# Patient Record
Sex: FEMALE | Race: WHITE | Employment: FULL TIME
[De-identification: names, ages, dates, MRNs, and addresses within clinical notes are randomized per-mention and may not be internally consistent; named-entity substitution may affect disease eponyms.]

---

## 2023-04-26 ENCOUNTER — HOSPITAL ENCOUNTER (EMERGENCY)
Facility: HOSPITAL | Age: 33
Discharge: HOME | End: 2023-04-26
Attending: EMERGENCY MEDICINE

## 2023-04-26 VITALS
SYSTOLIC BLOOD PRESSURE: 120 MMHG | TEMPERATURE: 97.4 F | DIASTOLIC BLOOD PRESSURE: 79 MMHG | HEART RATE: 74 BPM | OXYGEN SATURATION: 98 % | BODY MASS INDEX: 31.89 KG/M2 | HEIGHT: 63 IN | WEIGHT: 180 LBS | RESPIRATION RATE: 18 BRPM

## 2023-04-26 DIAGNOSIS — R42 DIZZINESS: Primary | ICD-10-CM

## 2023-04-26 DIAGNOSIS — Z33.1 PREGNANCY, INCIDENTAL: ICD-10-CM

## 2023-04-26 LAB
ALBUMIN SERPL-MCNC: 3.1 G/DL (ref 3.4–5)
ALBUMIN/GLOB SERPL: 0.8 (ref 0.8–1.7)
ALP SERPL-CCNC: 56 U/L (ref 45–117)
ALT SERPL-CCNC: 21 U/L (ref 13–56)
ANION GAP SERPL CALC-SCNC: 4 MMOL/L (ref 3–18)
APPEARANCE UR: CLEAR
AST SERPL-CCNC: 12 U/L (ref 10–38)
BASOPHILS # BLD: 0 K/UL (ref 0–0.1)
BASOPHILS NFR BLD: 0 % (ref 0–2)
BILIRUB SERPL-MCNC: 0.2 MG/DL (ref 0.2–1)
BILIRUB UR QL: NEGATIVE
BUN SERPL-MCNC: 12 MG/DL (ref 7–18)
BUN/CREAT SERPL: 17 (ref 12–20)
CALCIUM SERPL-MCNC: 8.7 MG/DL (ref 8.5–10.1)
CHLORIDE SERPL-SCNC: 106 MMOL/L (ref 100–111)
CO2 SERPL-SCNC: 26 MMOL/L (ref 21–32)
COLOR UR: YELLOW
CREAT SERPL-MCNC: 0.69 MG/DL (ref 0.6–1.3)
DIFFERENTIAL METHOD BLD: ABNORMAL
EOSINOPHIL # BLD: 0.1 K/UL (ref 0–0.4)
EOSINOPHIL NFR BLD: 1 % (ref 0–5)
ERYTHROCYTE [DISTWIDTH] IN BLOOD BY AUTOMATED COUNT: 13.7 % (ref 11.6–14.5)
GLOBULIN SER CALC-MCNC: 3.8 G/DL (ref 2–4)
GLUCOSE SERPL-MCNC: 102 MG/DL (ref 74–99)
GLUCOSE UR STRIP.AUTO-MCNC: NEGATIVE MG/DL
HCG UR QL: POSITIVE
HCT VFR BLD AUTO: 39.6 % (ref 35–45)
HGB BLD-MCNC: 13.2 G/DL (ref 12–16)
HGB UR QL STRIP: NEGATIVE
IMM GRANULOCYTES # BLD AUTO: 0.1 K/UL (ref 0–0.04)
IMM GRANULOCYTES NFR BLD AUTO: 0 % (ref 0–0.5)
KETONES UR QL STRIP.AUTO: NEGATIVE MG/DL
LEUKOCYTE ESTERASE UR QL STRIP.AUTO: NEGATIVE
LYMPHOCYTES # BLD: 2.9 K/UL (ref 0.9–3.6)
LYMPHOCYTES NFR BLD: 24 % (ref 21–52)
MAGNESIUM SERPL-MCNC: 2.1 MG/DL (ref 1.6–2.6)
MCH RBC QN AUTO: 28.6 PG (ref 24–34)
MCHC RBC AUTO-ENTMCNC: 33.3 G/DL (ref 31–37)
MCV RBC AUTO: 85.7 FL (ref 78–100)
MONOCYTES # BLD: 0.7 K/UL (ref 0.05–1.2)
MONOCYTES NFR BLD: 6 % (ref 3–10)
NEUTS SEG # BLD: 8 K/UL (ref 1.8–8)
NEUTS SEG NFR BLD: 68 % (ref 40–73)
NITRITE UR QL STRIP.AUTO: NEGATIVE
NRBC # BLD: 0 K/UL (ref 0–0.01)
NRBC BLD-RTO: 0 PER 100 WBC
NT PRO BNP: 57 PG/ML (ref 0–450)
PH UR STRIP: 6.5 (ref 5–8)
PLATELET # BLD AUTO: 302 K/UL (ref 135–420)
PMV BLD AUTO: 10.1 FL (ref 9.2–11.8)
POTASSIUM SERPL-SCNC: 3.6 MMOL/L (ref 3.5–5.5)
PROT SERPL-MCNC: 6.9 G/DL (ref 6.4–8.2)
PROT UR STRIP-MCNC: NEGATIVE MG/DL
RBC # BLD AUTO: 4.62 M/UL (ref 4.2–5.3)
SODIUM SERPL-SCNC: 136 MMOL/L (ref 136–145)
SP GR UR REFRACTOMETRY: 1.01 (ref 1–1.03)
TROPONIN I SERPL HS-MCNC: 4 NG/L (ref 0–54)
UROBILINOGEN UR QL STRIP.AUTO: 0.2 EU/DL (ref 0.2–1)
WBC # BLD AUTO: 11.8 K/UL (ref 4.6–13.2)

## 2023-04-26 PROCEDURE — 99284 EMERGENCY DEPT VISIT MOD MDM: CPT

## 2023-04-26 PROCEDURE — 84484 ASSAY OF TROPONIN QUANT: CPT

## 2023-04-26 PROCEDURE — 6360000002 HC RX W HCPCS: Performed by: PHYSICIAN ASSISTANT

## 2023-04-26 PROCEDURE — 80053 COMPREHEN METABOLIC PANEL: CPT

## 2023-04-26 PROCEDURE — 83735 ASSAY OF MAGNESIUM: CPT

## 2023-04-26 PROCEDURE — 93005 ELECTROCARDIOGRAM TRACING: CPT | Performed by: PHYSICIAN ASSISTANT

## 2023-04-26 PROCEDURE — 2580000003 HC RX 258: Performed by: PHYSICIAN ASSISTANT

## 2023-04-26 PROCEDURE — 81025 URINE PREGNANCY TEST: CPT

## 2023-04-26 PROCEDURE — 83880 ASSAY OF NATRIURETIC PEPTIDE: CPT

## 2023-04-26 PROCEDURE — 81003 URINALYSIS AUTO W/O SCOPE: CPT

## 2023-04-26 PROCEDURE — 85025 COMPLETE CBC W/AUTO DIFF WBC: CPT

## 2023-04-26 PROCEDURE — 96374 THER/PROPH/DIAG INJ IV PUSH: CPT

## 2023-04-26 RX ORDER — SODIUM CHLORIDE, SODIUM LACTATE, POTASSIUM CHLORIDE, AND CALCIUM CHLORIDE .6; .31; .03; .02 G/100ML; G/100ML; G/100ML; G/100ML
1000 INJECTION, SOLUTION INTRAVENOUS
Status: COMPLETED | OUTPATIENT
Start: 2023-04-26 | End: 2023-04-26

## 2023-04-26 RX ORDER — DIPHENHYDRAMINE HYDROCHLORIDE 50 MG/ML
25 INJECTION INTRAMUSCULAR; INTRAVENOUS
Status: COMPLETED | OUTPATIENT
Start: 2023-04-26 | End: 2023-04-26

## 2023-04-26 RX ADMIN — SODIUM CHLORIDE, POTASSIUM CHLORIDE, SODIUM LACTATE AND CALCIUM CHLORIDE 1000 ML: 600; 310; 30; 20 INJECTION, SOLUTION INTRAVENOUS at 19:00

## 2023-04-26 RX ADMIN — DIPHENHYDRAMINE HYDROCHLORIDE 25 MG: 50 INJECTION, SOLUTION INTRAMUSCULAR; INTRAVENOUS at 18:58

## 2023-04-26 NOTE — ED PROVIDER NOTES
findings:       Read by me, pending review by radiologist.     Interpretation per the Radiologist below, if available at the time of this note:  No orders to display           PROCEDURES   Unless otherwise noted below, none  Procedures         Nicoleside and DIFFERENTIAL DIAGNOSIS/MDM   Vitals:    Vitals:    04/26/23 1453 04/26/23 1456   BP:  120/79   Pulse:  74   Resp: 18    Temp: 97.4 °F (36.3 °C)    SpO2: 98%    Weight: 180 lb (81.6 kg)    Height: 5' 3\" (1.6 m)        Patient was given the following medications:  Medications   lactated ringers bolus (0 mLs IntraVENous Stopped 4/26/23 2105)   diphenhydrAMINE (BENADRYL) injection 25 mg (25 mg IntraVENous Given 4/26/23 1858)           Records Reviewed (source and summary): Old medical records. Nursing notes. ED COURSE       Medial Decision Making:  DDX: arrhythmia, dehydration, labyrinthitis, meniere's, vertigo, renal, metabolic derangement, No evidence of preeclampsia or HELLP. Doubt CVA/TIA    Pt feeling better with tx in ED. No FND. Ambulatory with no recurrence of sxs. VSS including BP unremarkable. Suspect peripheral etiology. OB or PCP FU. Reasons to RTED discussed with pt. All questions answered. Pt feels comfortable going home at this time. Pt expressed understanding and she agrees with plan. FINAL IMPRESSION     1. Dizziness    2. Pregnancy, incidental            DISPOSITION/PLAN   DISPOSITION Decision To Discharge 04/26/2023 08:56:55 PM           PATIENT REFERRED TO:  [address] [place] [address] Obgyn And Internal Medicine  [address] [facility] [address]        [facility] EMERGENCY DEPT  [address] [address]  [phone]             DISCHARGE MEDICATIONS:     Medication List      You have not been prescribed any medications. I am the Primary Clinician of Record.        (Please note that parts of this dictation were completed with voice recognition

## 2023-04-26 NOTE — ED NOTES
ORTHOSTATIC VITALS    LYING   BP: 110/64  HR: 66    SITTING  BP: 108/64  HR: 64      STANDING  BP:118/75  HR: [address] MARIAM Hartley  04/26/23 1906

## 2023-04-27 NOTE — ED NOTES
Patient given discharge papers. Patient understanding of discharge.  Patient ambulatory out of ED       Ruslan Pablo RN  04/26/23 5906

## 2023-04-28 LAB
EKG ATRIAL RATE: 68 BPM
EKG DIAGNOSIS: NORMAL
EKG P AXIS: 61 DEGREES
EKG P-R INTERVAL: 154 MS
EKG Q-T INTERVAL: 400 MS
EKG QRS DURATION: 74 MS
EKG QTC CALCULATION (BAZETT): 425 MS
EKG R AXIS: 38 DEGREES
EKG T AXIS: 37 DEGREES
EKG VENTRICULAR RATE: 68 BPM

## 2023-06-14 ENCOUNTER — HOSPITAL ENCOUNTER (OUTPATIENT)
Facility: HOSPITAL | Age: 33
Setting detail: OBSERVATION
Discharge: HOME | End: 2023-06-15
Attending: OBSTETRICS & GYNECOLOGY | Admitting: OBSTETRICS & GYNECOLOGY
Payer: COMMERCIAL

## 2023-06-14 PROBLEM — O26.879 SHORT CERVIX AFFECTING PREGNANCY: Status: ACTIVE | Noted: 2023-06-14

## 2023-06-14 LAB
ABO + RH BLD: NORMAL
APPEARANCE UR: ABNORMAL
BASOPHILS # BLD: 0 K/UL (ref 0–0.1)
BASOPHILS NFR BLD: 0 % (ref 0–2)
BILIRUB UR QL: NEGATIVE
BLOOD GROUP ANTIBODIES SERPL: NORMAL
COLOR UR: ABNORMAL
DIFFERENTIAL METHOD BLD: ABNORMAL
EOSINOPHIL # BLD: 0.1 K/UL (ref 0–0.4)
EOSINOPHIL NFR BLD: 1 % (ref 0–5)
ERYTHROCYTE [DISTWIDTH] IN BLOOD BY AUTOMATED COUNT: 13.9 % (ref 11.6–14.5)
GLUCOSE UR QL STRIP.AUTO: NEGATIVE MG/DL
HCT VFR BLD AUTO: 36 % (ref 35–45)
HGB BLD-MCNC: 11.8 G/DL (ref 12–16)
IMM GRANULOCYTES # BLD AUTO: 0 K/UL (ref 0–0.04)
IMM GRANULOCYTES NFR BLD AUTO: 0 % (ref 0–0.5)
KETONES UR-MCNC: NEGATIVE MG/DL
LEUKOCYTE ESTERASE UR QL STRIP: NEGATIVE
LYMPHOCYTES # BLD: 2.7 K/UL (ref 0.9–3.6)
LYMPHOCYTES NFR BLD: 24 % (ref 21–52)
MCH RBC QN AUTO: 29 PG (ref 24–34)
MCHC RBC AUTO-ENTMCNC: 32.8 G/DL (ref 31–37)
MCV RBC AUTO: 88.5 FL (ref 78–100)
MONOCYTES # BLD: 0.7 K/UL (ref 0.05–1.2)
MONOCYTES NFR BLD: 6 % (ref 3–10)
NEUTS SEG # BLD: 7.6 K/UL (ref 1.8–8)
NEUTS SEG NFR BLD: 68 % (ref 40–73)
NITRITE UR QL: NEGATIVE
NRBC # BLD: 0 K/UL (ref 0–0.01)
NRBC BLD-RTO: 0 PER 100 WBC
PH UR: 6 (ref 5–9)
PLATELET # BLD AUTO: 285 K/UL (ref 135–420)
PMV BLD AUTO: 9.6 FL (ref 9.2–11.8)
PROT UR QL: ABNORMAL MG/DL
RBC # BLD AUTO: 4.07 M/UL (ref 4.2–5.3)
RBC # UR STRIP: NEGATIVE
SERVICE CMNT-IMP: ABNORMAL
SP GR UR: >1.03 (ref 1–1.02)
SPECIMEN EXP DATE BLD: NORMAL
UROBILINOGEN UR QL: 0.2 EU/DL (ref 0.2–1)
WBC # BLD AUTO: 11.1 K/UL (ref 4.6–13.2)

## 2023-06-14 PROCEDURE — 86900 BLOOD TYPING SEROLOGIC ABO: CPT

## 2023-06-14 PROCEDURE — 86901 BLOOD TYPING SEROLOGIC RH(D): CPT

## 2023-06-14 PROCEDURE — 85025 COMPLETE CBC W/AUTO DIFF WBC: CPT

## 2023-06-14 PROCEDURE — 36415 COLL VENOUS BLD VENIPUNCTURE: CPT

## 2023-06-14 PROCEDURE — 81003 URINALYSIS AUTO W/O SCOPE: CPT

## 2023-06-14 PROCEDURE — G0378 HOSPITAL OBSERVATION PER HR: HCPCS

## 2023-06-14 PROCEDURE — 86850 RBC ANTIBODY SCREEN: CPT

## 2023-06-14 RX ORDER — EPINEPHRINE 0.3 MG/.3ML
0.3 INJECTION SUBCUTANEOUS PRN
Status: ON HOLD | COMMUNITY
Start: 2021-07-21 | End: 2023-06-15 | Stop reason: HOSPADM

## 2023-06-14 RX ORDER — FLUTICASONE PROPIONATE 50 MCG
1-2 SPRAY, SUSPENSION (ML) NASAL
COMMUNITY
Start: 2023-02-20

## 2023-06-14 RX ORDER — SERTRALINE HYDROCHLORIDE 25 MG/1
25 TABLET, FILM COATED ORAL DAILY
COMMUNITY
Start: 2023-05-18

## 2023-06-14 RX ORDER — BACILLUS COAGULANS/INULIN 1B-250 MG
1 CAPSULE ORAL
COMMUNITY

## 2023-06-14 NOTE — PROGRESS NOTES
Spoke with Sapna Cabrera CNM regarding pt requesting to discontinue use of monitors. CNM stated qshift NST once we get a reactive strip. Orders to be placed.

## 2023-06-15 ENCOUNTER — HOSPITAL ENCOUNTER (OUTPATIENT)
Facility: HOSPITAL | Age: 33
Setting detail: OBSERVATION
Discharge: HOME | End: 2023-06-18
Payer: COMMERCIAL

## 2023-06-15 VITALS
WEIGHT: 185 LBS | SYSTOLIC BLOOD PRESSURE: 112 MMHG | BODY MASS INDEX: 32.78 KG/M2 | OXYGEN SATURATION: 100 % | RESPIRATION RATE: 16 BRPM | HEART RATE: 71 BPM | TEMPERATURE: 98.5 F | DIASTOLIC BLOOD PRESSURE: 55 MMHG | HEIGHT: 63 IN

## 2023-06-15 LAB
APPEARANCE UR: CLEAR
BILIRUB UR QL: NEGATIVE
COLOR UR: YELLOW
GLUCOSE UR STRIP.AUTO-MCNC: NEGATIVE MG/DL
HGB UR QL STRIP: NEGATIVE
KETONES UR QL STRIP.AUTO: NEGATIVE MG/DL
LEUKOCYTE ESTERASE UR QL STRIP.AUTO: NEGATIVE
NITRITE UR QL STRIP.AUTO: NEGATIVE
PH UR STRIP: 6 (ref 5–8)
PROT UR STRIP-MCNC: NEGATIVE MG/DL
SERVICE CMNT-IMP: NORMAL
SP GR UR REFRACTOMETRY: 1.01 (ref 1–1.03)
UROBILINOGEN UR QL STRIP.AUTO: 0.2 EU/DL (ref 0.2–1)
WET PREP GENITAL: NORMAL

## 2023-06-15 PROCEDURE — 81003 URINALYSIS AUTO W/O SCOPE: CPT

## 2023-06-15 PROCEDURE — 6370000000 HC RX 637 (ALT 250 FOR IP): Performed by: MIDWIFE

## 2023-06-15 PROCEDURE — 87109 MYCOPLASMA: CPT

## 2023-06-15 PROCEDURE — G0378 HOSPITAL OBSERVATION PER HR: HCPCS

## 2023-06-15 PROCEDURE — 76815 OB US LIMITED FETUS(S): CPT

## 2023-06-15 PROCEDURE — 87210 SMEAR WET MOUNT SALINE/INK: CPT

## 2023-06-15 RX ORDER — FLUCONAZOLE 100 MG/1
150 TABLET ORAL
Status: DISCONTINUED | OUTPATIENT
Start: 2023-06-15 | End: 2023-06-15

## 2023-06-15 RX ORDER — FLUCONAZOLE 100 MG/1
200 TABLET ORAL ONCE
Status: COMPLETED | OUTPATIENT
Start: 2023-06-15 | End: 2023-06-15

## 2023-06-15 RX ADMIN — FLUCONAZOLE 200 MG: 100 TABLET ORAL at 11:34

## 2023-06-15 NOTE — H&P
Department of Obstetrics and Gynecology  [facility] Obstetrics History and Physical        CHIEF COMPLAINT:  Patient sent from office for shortened cervix    HISTORY OF PRESENT ILLNESS:      The patient is a 34 y.o.  at 24.6 weeks sent from office due to shortened cervix. Patient had CL check and cervix was 1.7cm with funneling noted. Reports +FM. Denies any vaginal bleeding, cramping, or contractions. Prenatal history complicated by hx of LEEP, and history of pre-eclampsia. Please see prenatal records for more details. OB History    Para Term  AB Living   2 1 1     1   SAB IAB Ectopic Molar Multiple Live Births             1      # Outcome Date GA Lbr Baljit/2nd Weight Sex Delivery Anes PTL Lv   2 Current            1 Term 19 38w0d 06:23 / 02:18 8 lb 4 oz (3.742 kg) M Vag-Spont EPI N TARI       Medications Prior to Admission:  Medications Prior to Admission: Prenatal Multivit-Min-Fe-FA (PRENATAL 1 + IRON PO), Take 1 tablet by mouth daily  doxyLAMINE succinate (GNP SLEEP AID) 25 MG tablet, Take 1 tablet by mouth nightly as needed  EPINEPHrine (EPIPEN) 0.3 MG/0.3ML SOAJ injection, Inject 0.3 mLs into the muscle as needed (Patient not taking: Reported on 2023)  fluticasone (FLONASE) 50 MCG/ACT nasal spray, 1-2 sprays by Nasal route  Bacillus Coagulans-Inulin (PROBIOTIC) 1-250 BILLION-MG CAPS, Take 1 tablet by mouth  sertraline (ZOLOFT) 25 MG tablet, Take 1 tablet by mouth daily  Allergies:  Adhesive tape and Hydromorphone hcl    REVIEW OF SYSTEMS:    Patient has no history of depression. Patient has no symptoms of depression  Review of systems wnl with exception of HPI    PHYSICAL EXAM:    General appearance:  awake, alert, cooperative, no apparent distress, and appears stated age  Neurologic:  Awake, alert, oriented to name, place and time.     Lungs:  normal effort  Abdomen:  soft, nontender  Fetal heart rate:  Baseline Heart Rate 140, accelerations:  present  long term variability:

## 2023-06-15 NOTE — PROGRESS NOTES
Assessment:    US showed cervix length of 3.75 cm with no funneling. Patient to be discharged with strict PTL labor precautions and pelvic rest until further notice. [facility] referral sent. [facility] to reach out to patient to schedule US. Dr. Leida Ashton aware of plan.     Camden Angel, JEFRY  06/15/23

## 2023-06-15 NOTE — PROGRESS NOTES
9250 Bedside and verbal SBAR shift report received from Porter Alvarez RN. Assumed care of patient at this time. [id number] Call placed to Ultrasound. Message left. 1220 This RN at patient bedside for discharge instruction. Patient verbalizes understanding, but upset that there is not more detailed printed instruction. 1229 Patient discharged and off unit at this time.

## 2023-06-15 NOTE — PROGRESS NOTES
Ante Partum High Risk Pregnancy Note      Patient: Sharmin Pierre               Sex: female          DOA: [unfilled]       YOB: 1990      Age:  34 y.o.        LOS:  LOS: 0 days     Patient admitted for  observation for PTL due to shortened cervix (1.7cm)  states she does not  have  contractions, LOF, vaginal bleeding . Patient endorses good fetal movement    LOS:  1  Vitals: Temp (24hrs), Av.2 °F (36.8 °C), Min:98 °F (36.7 °C), Max:98.5 °F (36.9 °C)   Patient Vitals for the past 24 hrs:   BP   06/15/23 0738 (!) 112/55   06/15/23 0621 (!) 103/51   23 114/66   23 1148 (!) 96/47       I&O:   No intake/output data recorded. No intake/output data recorded. Exam:  Patient without distress. Fetal Monitoring:  Baseline: 140 bpm, Variability: moderate,  Accelerations: present, and Decelerations: Absent   Uterine Activity: None    NST:  reactive        Labs:   labs reviewed    Assessment and Plan:      Principal Problem:    Short cervix affecting pregnancy  Resolved Problems:    * No resolved hospital problems. *      Transvaginal US complete, report pending. UA, wet prep, and ureaplasma/mycoplasma collected and pending.     Cem Cleaning CNM  06/15/23

## 2023-06-21 LAB
M HOMINIS SPEC QL CULT: NEGATIVE
SPECIMEN SOURCE: NORMAL
U UREALYTICUM SPEC QL CULT: NEGATIVE